# Patient Record
Sex: FEMALE | Race: WHITE | Employment: FULL TIME | URBAN - METROPOLITAN AREA
[De-identification: names, ages, dates, MRNs, and addresses within clinical notes are randomized per-mention and may not be internally consistent; named-entity substitution may affect disease eponyms.]

---

## 2019-07-14 ENCOUNTER — HOSPITAL ENCOUNTER (EMERGENCY)
Age: 19
Discharge: HOME | End: 2019-07-14
Attending: EMERGENCY MEDICINE
Payer: COMMERCIAL

## 2019-07-14 ENCOUNTER — APPOINTMENT (OUTPATIENT)
Dept: GENERAL RADIOLOGY | Age: 19
End: 2019-07-14
Payer: COMMERCIAL

## 2019-07-14 VITALS
TEMPERATURE: 98.5 F | HEART RATE: 109 BPM | DIASTOLIC BLOOD PRESSURE: 76 MMHG | HEIGHT: 63 IN | WEIGHT: 189.56 LBS | RESPIRATION RATE: 18 BRPM | SYSTOLIC BLOOD PRESSURE: 124 MMHG | OXYGEN SATURATION: 100 % | BODY MASS INDEX: 33.59 KG/M2

## 2019-07-14 DIAGNOSIS — S92.351A DISPLACED FRACTURE OF FIFTH METATARSAL BONE, RIGHT FOOT, INITIAL ENCOUNTER FOR CLOSED FRACTURE: Primary | ICD-10-CM

## 2019-07-14 PROCEDURE — 4500000023 HC ED LEVEL 3 PROCEDURE

## 2019-07-14 PROCEDURE — 73630 X-RAY EXAM OF FOOT: CPT

## 2019-07-14 PROCEDURE — 99283 EMERGENCY DEPT VISIT LOW MDM: CPT

## 2019-07-14 SDOH — HEALTH STABILITY: MENTAL HEALTH: HOW OFTEN DO YOU HAVE A DRINK CONTAINING ALCOHOL?: NEVER

## 2019-07-14 ASSESSMENT — PAIN DESCRIPTION - LOCATION: LOCATION: FOOT

## 2019-07-14 ASSESSMENT — PAIN DESCRIPTION - ORIENTATION: ORIENTATION: RIGHT

## 2019-07-14 ASSESSMENT — PAIN DESCRIPTION - DESCRIPTORS: DESCRIPTORS: ACHING

## 2019-07-14 ASSESSMENT — PAIN SCALES - GENERAL
PAINLEVEL_OUTOF10: 0
PAINLEVEL_OUTOF10: 0

## 2019-07-14 ASSESSMENT — PAIN DESCRIPTION - FREQUENCY: FREQUENCY: INTERMITTENT

## 2019-07-14 ASSESSMENT — PAIN DESCRIPTION - PAIN TYPE: TYPE: ACUTE PAIN

## 2019-07-15 ENCOUNTER — TELEPHONE (OUTPATIENT)
Dept: ORTHOPEDIC SURGERY | Age: 19
End: 2019-07-15

## 2019-07-15 NOTE — ED NOTES
Right foot welling and bruising. Denies pain at rest. She states it hurt when she walks on it.       Rahel Vu RN  07/14/19 9351

## 2019-07-15 NOTE — ED NOTES
Patient states she walked on right foot while on vacation after injury. She did not seek treatment until now.       Rhiannon Cline RN  07/14/19 9451

## 2019-07-15 NOTE — ED PROVIDER NOTES
Resp SpO2 Height Weight   -- -- -- -- -- -- -- --       General: Alert moderately obese white female no acute distress. Musculoskeletal: Right knee is nontender without swelling. Full range of motion. Right tib-fib and ankle are nontender without swelling. Full range of motion the ankle without pain. There is significant tenderness along the entire right fifth metatarsal area over the dorsal lateral foot. There is moderate swelling in the entire mid and distal foot. Intact distal pulses. Calcaneus is nontender. The Achilles tendon is intact and nontender. Skin: Bruising over the dorsal foot in the area of the third fourth and fifth metatarsals and proximal phalanges of the third fourth and fifth toes. No erythema. No open wounds. Neuro: Intact motor function sensation right lower extremity. DIAGNOSTIC RESULTS     RADIOLOGY:   Non-plain film images such as CT, Ultrasound and MRI are read by the radiologist. Plain radiographic images are visualized and preliminarily interpreted by Madison Blackwood MD with the below findings:      Interpretation per the Radiologist below, if available at the time of this note:    XR FOOT RIGHT (MIN 3 VIEWS)   Final Result   Acute 5th metatarsal fracture. LABS:  Labs Reviewed - No data to display    All other labs were within normal range or not returned as of this dictation. EMERGENCY DEPARTMENT COURSE and DIFFERENTIAL DIAGNOSIS/MDM:   Vitals:    Vitals:    07/14/19 2159   BP: 124/76   Pulse: 109   Resp: 18   Temp: 98.5 °F (36.9 °C)   TempSrc: Oral   SpO2: 100%   Weight: 189 lb 9 oz (86 kg)   Height: 5' 3.25\" (1.607 m)       The patient stepped off of a step and twisted her right foot injuring it 4 days ago. She has a displaced/angulated distal fifth metatarsal fracture of the right foot. I spoke with orthopedics, Dr. Fatemeh Ayala.   He will see the patient in the office on Tuesday at [facility] or Wednesday at [facility].  She was placed in a posterior splint. She was put on crutches, nonweightbearing. We discussed control of pain, she prefers to take Tylenol or ibuprofen. She has no other associated injuries. The x-ray results, treatment plan, and follow-up were discussed with the patient and she understands the treatment plan and will follow-up. PROCEDURES:  Splint Application  Date/Time: 7/14/2019 10:48 PM  Performed by: Anam Ascencio MD  Authorized by: Anam Ascencio MD     Consent:     Consent obtained:  Verbal    Consent given by:  Patient    Risks discussed:  Discoloration, numbness, pain and swelling  Pre-procedure details:     Sensation:  Normal    Skin color:  Bruising  Procedure details:     Laterality:  Right    Location:  Foot    Foot:  R foot    Splint type:  Short leg    Supplies:  Ortho-Glass, cotton padding and elastic bandage  Post-procedure details:     Pain:  Improved    Sensation:  Normal    Skin color:  Unchanged    Patient tolerance of procedure: Tolerated well, no immediate complications          FINAL IMPRESSION      1. Displaced fracture of fifth metatarsal bone, right foot, initial encounter for closed fracture          DISPOSITION/PLAN   DISPOSITION Decision To Discharge 07/14/2019 10:45:29 PM      PATIENT REFERRED TO:  Geneva Hines MD  [address], [address] [address]  [address] [place] [address]  [phone]    In 2 days  Call tomorrow for an appointment on Wednesday at [facility]. He can also see you Wednesday in the [facility].       DISCHARGE MEDICATIONS:  New Prescriptions    No medications on file       (Please note that portions of this note were completed with a voice recognition program.  Efforts were made to edit the dictations but occasionally words are mis-transcribed.)    Wanda Hernandez MD  Attending Emergency Physician        Anam Ascencio MD  07/14/19 1628

## 2019-07-17 ENCOUNTER — OFFICE VISIT (OUTPATIENT)
Dept: ORTHOPEDIC SURGERY | Age: 19
End: 2019-07-17
Payer: COMMERCIAL

## 2019-07-17 VITALS
SYSTOLIC BLOOD PRESSURE: 132 MMHG | WEIGHT: 189 LBS | RESPIRATION RATE: 16 BRPM | HEART RATE: 120 BPM | HEIGHT: 63 IN | BODY MASS INDEX: 33.49 KG/M2 | DIASTOLIC BLOOD PRESSURE: 83 MMHG

## 2019-07-17 DIAGNOSIS — S92.351A DISPLACED FRACTURE OF FIFTH METATARSAL BONE, RIGHT FOOT, INITIAL ENCOUNTER FOR CLOSED FRACTURE: Primary | ICD-10-CM

## 2019-07-17 PROCEDURE — 1036F TOBACCO NON-USER: CPT | Performed by: ORTHOPAEDIC SURGERY

## 2019-07-17 PROCEDURE — L3260 AMBULATORY SURGICAL BOOT EAC: HCPCS | Performed by: ORTHOPAEDIC SURGERY

## 2019-07-17 PROCEDURE — G8427 DOCREV CUR MEDS BY ELIG CLIN: HCPCS | Performed by: ORTHOPAEDIC SURGERY

## 2019-07-17 PROCEDURE — G8417 CALC BMI ABV UP PARAM F/U: HCPCS | Performed by: ORTHOPAEDIC SURGERY

## 2019-07-17 PROCEDURE — 99203 OFFICE O/P NEW LOW 30 MIN: CPT | Performed by: ORTHOPAEDIC SURGERY

## 2019-07-17 PROCEDURE — 28470 CLTX METATARSAL FX WO MNP EA: CPT | Performed by: ORTHOPAEDIC SURGERY

## 2019-07-23 PROBLEM — S92.351A DISPLACED FRACTURE OF FIFTH METATARSAL BONE, RIGHT FOOT, INITIAL ENCOUNTER FOR CLOSED FRACTURE: Status: ACTIVE | Noted: 2019-07-23

## 2019-08-28 ENCOUNTER — TELEPHONE (OUTPATIENT)
Dept: ORTHOPEDIC SURGERY | Age: 19
End: 2019-08-28

## 2019-08-28 ENCOUNTER — OFFICE VISIT (OUTPATIENT)
Dept: ORTHOPEDIC SURGERY | Age: 19
End: 2019-08-28

## 2019-08-28 VITALS — HEIGHT: 63 IN | WEIGHT: 189 LBS | BODY MASS INDEX: 33.49 KG/M2

## 2019-08-28 DIAGNOSIS — S92.351A DISPLACED FRACTURE OF FIFTH METATARSAL BONE, RIGHT FOOT, INITIAL ENCOUNTER FOR CLOSED FRACTURE: Primary | ICD-10-CM

## 2019-08-28 PROCEDURE — 99024 POSTOP FOLLOW-UP VISIT: CPT | Performed by: NURSE PRACTITIONER

## 2019-08-28 PROCEDURE — APPNB15 APP NON BILLABLE TIME 0-15 MINS: Performed by: NURSE PRACTITIONER
